# Patient Record
Sex: FEMALE | Race: ASIAN | NOT HISPANIC OR LATINO | Employment: UNEMPLOYED | URBAN - METROPOLITAN AREA
[De-identification: names, ages, dates, MRNs, and addresses within clinical notes are randomized per-mention and may not be internally consistent; named-entity substitution may affect disease eponyms.]

---

## 2017-01-30 ENCOUNTER — AMBULATORY - HEALTHEAST (OUTPATIENT)
Dept: HEALTH INFORMATION MANAGEMENT | Facility: CLINIC | Age: 47
End: 2017-01-30

## 2021-05-28 ENCOUNTER — RECORDS - HEALTHEAST (OUTPATIENT)
Dept: ADMINISTRATIVE | Facility: CLINIC | Age: 51
End: 2021-05-28

## 2021-05-29 ENCOUNTER — RECORDS - HEALTHEAST (OUTPATIENT)
Dept: ADMINISTRATIVE | Facility: CLINIC | Age: 51
End: 2021-05-29

## 2021-05-30 ENCOUNTER — RECORDS - HEALTHEAST (OUTPATIENT)
Dept: ADMINISTRATIVE | Facility: CLINIC | Age: 51
End: 2021-05-30

## 2021-05-31 ENCOUNTER — RECORDS - HEALTHEAST (OUTPATIENT)
Dept: ADMINISTRATIVE | Facility: CLINIC | Age: 51
End: 2021-05-31

## 2023-03-29 ENCOUNTER — TRANSFERRED RECORDS (OUTPATIENT)
Dept: HEALTH INFORMATION MANAGEMENT | Facility: CLINIC | Age: 53
End: 2023-03-29

## 2023-03-30 ENCOUNTER — TELEPHONE (OUTPATIENT)
Dept: ALLERGY | Facility: CLINIC | Age: 53
End: 2023-03-30

## 2023-03-30 NOTE — TELEPHONE ENCOUNTER
The phone number provide voice mail is full, not able to left a message 3/30. Referral from [facility] need schedule for environmental allergy.

## 2023-08-29 ENCOUNTER — OFFICE VISIT (OUTPATIENT)
Dept: ALLERGY | Facility: CLINIC | Age: 53
End: 2023-08-29
Payer: COMMERCIAL

## 2023-08-29 VITALS
HEART RATE: 65 BPM | BODY MASS INDEX: 27.97 KG/M2 | OXYGEN SATURATION: 98 % | RESPIRATION RATE: 18 BRPM | WEIGHT: 152.9 LBS

## 2023-08-29 DIAGNOSIS — T48.5X5A RHINITIS MEDICAMENTOSA: ICD-10-CM

## 2023-08-29 DIAGNOSIS — J31.0 RHINITIS MEDICAMENTOSA: ICD-10-CM

## 2023-08-29 DIAGNOSIS — J31.0 NONALLERGIC RHINITIS: ICD-10-CM

## 2023-08-29 DIAGNOSIS — J30.1 SEASONAL ALLERGIC RHINITIS DUE TO POLLEN: Primary | ICD-10-CM

## 2023-08-29 PROCEDURE — 99204 OFFICE O/P NEW MOD 45 MIN: CPT | Mod: 25 | Performed by: ALLERGY & IMMUNOLOGY

## 2023-08-29 PROCEDURE — 95004 PERQ TESTS W/ALRGNC XTRCS: CPT | Performed by: ALLERGY & IMMUNOLOGY

## 2023-08-29 RX ORDER — FLUTICASONE PROPIONATE 50 MCG
2 SPRAY, SUSPENSION (ML) NASAL DAILY
Qty: 16 G | Refills: 11 | Status: SHIPPED | OUTPATIENT
Start: 2023-08-29

## 2023-08-29 RX ORDER — CETIRIZINE HYDROCHLORIDE 10 MG/1
10 TABLET ORAL DAILY
Qty: 90 TABLET | Refills: 3 | Status: SHIPPED | OUTPATIENT
Start: 2023-08-29

## 2023-08-29 NOTE — PATIENT INSTRUCTIONS
Spring, fall allergies    Also, non-allergic rhinitis    Cetirizine 10 mg daily     Flonase 2 sprays each nostril daily    Stop Decongestant--

## 2023-08-29 NOTE — LETTER
8/29/2023         RE: Arleth Gupta  [address]  [place] [address]        Dear Colleague,    Thank you for referring your patient, Arleth Gupta, to the [facility]. Please see a copy of my visit note below.          Subjective  Arlteh is a 52 year old, presenting for the following health issues:  Allergy Consult (Nasal congestion, itchy nose, sneezing. Worse in the morning. )    HPI     Chief complaint:  Allergies    History of Present Illness: This is a pleasant 52-year-old woman I was asked to see for patient by Solange Gupta in regards to allergies.  Patient has had allergy symptoms for many years.  Symptoms occur throughout the year.  She states she has a lot of mucus in her nose and she has to use Claritin-D at least a few per day to improve symptoms.  She states she does not use any nasal rinses or sprays.  No history of asthma.  No cough, wheeze or shortness of breath.  No previous allergy testing.  No history of sinus infections or pneumonias.  No eczema.  She states that her congestion is worse in the morning and she states that cold air seems to make the congestion worse as well.,  Non-smoker    Past medical history: Otherwise unremarkable, cholecystectomy, appendectomy    Social history: They live in an older home that is well-kept, no mold damage, central air, finished basement, no pets, non-smoker     family history: Sons with allergies              Review of Systems   Constitutional, HEENT, cardiovascular, pulmonary, GI, , musculoskeletal, neuro, skin, endocrine and psych systems are negative, except as otherwise noted.      Objective   Pulse 65   Resp 18   Wt 69.4 kg (152 lb 14.4 oz)   SpO2 98%   BMI 27.97 kg/m    Body mass index is 27.97 kg/m .  Physical Exam     Gen: Pleasant female not in acute distress  HEENT: Eyes no erythema of the bulbar or palpebral conjunctiva, no edema. Ears: TMs well visualized, no effusions. Nose: Thick mucus drainage both nasal  passages, pale mucosa, congestion mouth: Throat drainage no lip or tongue edema.   Cardiac: Regular rate and rhythm, no murmurs, rubs or gallops  Respiratory: Clear to auscultation bilaterally, no adventitious breath sounds  Lymph: No visible supraclavicular or cervical lymphadenopathy  Skin: No rashes or lesions    Psych: Alert and oriented times 3      At today s visit the patient/parent and I engaged in an informed consent discussion about allergy testing.  We discussed skin testing, blood testing,  and the alternative of not undergoing any testing. The patient/ parent has a preference for skin testing. We then discussed the risks and benefits of skin testing.  The patient/ parent understands skin testing risks can include, but are not limited to, urticaria, angioedema, shortness of breath, and severe anaphylaxis.  The benefits include, but are not limited, to evaluation for allergens causing symptoms.  After answering the patients/parents questions they have agreed to proceed with skin testing.    30 percutaneous test were placed to the environmental skin test panel.  Positive histamine control with positive test to tree pollen and weed pollen.  Please see scanned photograph.    Impression report and plan:    1.  Allergic rhinitis  2.  Nonallergic rhinitis  3.  Rhinitis medicamentosa    She has combination of allergic and nonallergic rhinitis.  Recommended avoiding decongestants as a suspect this is worsening the symptoms.  Recommended cetirizine 10 mg daily during the spring and fall as well as fluticasone nasal spray 2 sprays each nostril daily.  She will like to consider allergy shots.  I went over the risks and benefits of allergy shots.  I stated risks include hives, swelling, shortness of breath.  I did state that one in 2.5 million shot administrations can result in death.  I stated they must wait in the office for 30 minutes following the shot and carry an epinephrine device on the day of the shot.  I  stated that shots are effective in about 90% of patients.  I stated that they should check with the insurance company prior to proceeding.  They understand the risks and benefits and agreed to proceed.  Epinephrine device will be prescribed.  They are traveling to [place] and will let us know when they return and we will mix the allergy shots at that time.          Again, thank you for allowing me to participate in the care of your patient.        Sincerely,        Anne CHAVEZ MD

## 2023-08-29 NOTE — PROGRESS NOTES
Subjective   Arleth is a 52 year old, presenting for the following health issues:  Allergy Consult (Nasal congestion, itchy nose, sneezing. Worse in the morning. )    HPI     Chief complaint:  Allergies    History of Present Illness: This is a pleasant 52-year-old woman I was asked to see for patient by Solange Gupta in regards to allergies.  Patient has had allergy symptoms for many years.  Symptoms occur throughout the year.  She states she has a lot of mucus in her nose and she has to use Claritin-D at least a few per day to improve symptoms.  She states she does not use any nasal rinses or sprays.  No history of asthma.  No cough, wheeze or shortness of breath.  No previous allergy testing.  No history of sinus infections or pneumonias.  No eczema.  She states that her congestion is worse in the morning and she states that cold air seems to make the congestion worse as well.,  Non-smoker    Past medical history: Otherwise unremarkable, cholecystectomy, appendectomy    Social history: They live in an older home that is well-kept, no mold damage, central air, finished basement, no pets, non-smoker     family history: Sons with allergies              Review of Systems   Constitutional, HEENT, cardiovascular, pulmonary, GI, , musculoskeletal, neuro, skin, endocrine and psych systems are negative, except as otherwise noted.      Objective    Pulse 65   Resp 18   Wt 69.4 kg (152 lb 14.4 oz)   SpO2 98%   BMI 27.97 kg/m    Body mass index is 27.97 kg/m .  Physical Exam     Gen: Pleasant female not in acute distress  HEENT: Eyes no erythema of the bulbar or palpebral conjunctiva, no edema. Ears: TMs well visualized, no effusions. Nose: Thick mucus drainage both nasal passages, pale mucosa, congestion mouth: Throat drainage no lip or tongue edema.   Cardiac: Regular rate and rhythm, no murmurs, rubs or gallops  Respiratory: Clear to auscultation bilaterally, no adventitious breath sounds  Lymph: No visible  supraclavicular or cervical lymphadenopathy  Skin: No rashes or lesions    Psych: Alert and oriented times 3      At today s visit the patient/parent and I engaged in an informed consent discussion about allergy testing.  We discussed skin testing, blood testing,  and the alternative of not undergoing any testing. The patient/ parent has a preference for skin testing. We then discussed the risks and benefits of skin testing.  The patient/ parent understands skin testing risks can include, but are not limited to, urticaria, angioedema, shortness of breath, and severe anaphylaxis.  The benefits include, but are not limited, to evaluation for allergens causing symptoms.  After answering the patients/parents questions they have agreed to proceed with skin testing.    30 percutaneous test were placed to the environmental skin test panel.  Positive histamine control with positive test to tree pollen and weed pollen.  Please see scanned photograph.    Impression report and plan:    1.  Allergic rhinitis  2.  Nonallergic rhinitis  3.  Rhinitis medicamentosa    She has combination of allergic and nonallergic rhinitis.  Recommended avoiding decongestants as a suspect this is worsening the symptoms.  Recommended cetirizine 10 mg daily during the spring and fall as well as fluticasone nasal spray 2 sprays each nostril daily.  She will like to consider allergy shots.  I went over the risks and benefits of allergy shots.  I stated risks include hives, swelling, shortness of breath.  I did state that one in 2.5 million shot administrations can result in death.  I stated they must wait in the office for 30 minutes following the shot and carry an epinephrine device on the day of the shot.  I stated that shots are effective in about 90% of patients.  I stated that they should check with the insurance company prior to proceeding.  They understand the risks and benefits and agreed to proceed.  Epinephrine device will be prescribed.   They are traveling to [place] and will let us know when they return and we will mix the allergy shots at that time.

## 2024-04-28 ENCOUNTER — APPOINTMENT (OUTPATIENT)
Dept: RADIOLOGY | Facility: HOSPITAL | Age: 54
End: 2024-04-28
Attending: EMERGENCY MEDICINE

## 2024-04-28 ENCOUNTER — HOSPITAL ENCOUNTER (EMERGENCY)
Facility: HOSPITAL | Age: 54
Discharge: HOME | End: 2024-04-28
Attending: EMERGENCY MEDICINE | Admitting: EMERGENCY MEDICINE

## 2024-04-28 ENCOUNTER — APPOINTMENT (OUTPATIENT)
Dept: CT IMAGING | Facility: HOSPITAL | Age: 54
End: 2024-04-28
Attending: EMERGENCY MEDICINE

## 2024-04-28 VITALS
TEMPERATURE: 98 F | RESPIRATION RATE: 18 BRPM | DIASTOLIC BLOOD PRESSURE: 86 MMHG | SYSTOLIC BLOOD PRESSURE: 154 MMHG | OXYGEN SATURATION: 95 % | HEART RATE: 76 BPM

## 2024-04-28 DIAGNOSIS — S09.90XA CLOSED HEAD INJURY, INITIAL ENCOUNTER: ICD-10-CM

## 2024-04-28 DIAGNOSIS — S80.12XA TRAUMATIC ECCHYMOSIS OF LOWER LEG, LEFT, INITIAL ENCOUNTER: ICD-10-CM

## 2024-04-28 DIAGNOSIS — S80.11XA TRAUMATIC ECCHYMOSIS OF LOWER LEG, RIGHT, INITIAL ENCOUNTER: ICD-10-CM
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
## 2024-04-28 PROCEDURE — 96374 THER/PROPH/DIAG INJ IV PUSH: CPT

## 2024-04-28 PROCEDURE — 250N000013 HC RX MED GY IP 250 OP 250 PS 637: Performed by: EMERGENCY MEDICINE

## 2024-04-28 PROCEDURE — 72125 CT NECK SPINE W/O DYE: CPT

## 2024-04-28 PROCEDURE — 70450 CT HEAD/BRAIN W/O DYE: CPT

## 2024-04-28 PROCEDURE — 96375 TX/PRO/DX INJ NEW DRUG ADDON: CPT

## 2024-04-28 PROCEDURE — 73590 X-RAY EXAM OF LOWER LEG: CPT | Mod: RT

## 2024-04-28 PROCEDURE — 99285 EMERGENCY DEPT VISIT HI MDM: CPT | Mod: 25

## 2024-04-28 PROCEDURE — 250N000011 HC RX IP 250 OP 636: Performed by: EMERGENCY MEDICINE

## 2024-04-28 RX ORDER — ONDANSETRON 2 MG/ML
8 INJECTION INTRAMUSCULAR; INTRAVENOUS ONCE
Status: COMPLETED | OUTPATIENT
Start: 2024-04-28 | End: 2024-04-28

## 2024-04-28 RX ORDER — ACETAMINOPHEN 325 MG/1
975 TABLET ORAL ONCE
Status: COMPLETED | OUTPATIENT
Start: 2024-04-28 | End: 2024-04-28

## 2024-04-28 RX ORDER — KETOROLAC TROMETHAMINE 30 MG/ML
30 INJECTION, SOLUTION INTRAMUSCULAR; INTRAVENOUS ONCE
Status: COMPLETED | OUTPATIENT
Start: 2024-04-28 | End: 2024-04-28

## 2024-04-28 RX ADMIN — ACETAMINOPHEN 975 MG: 325 TABLET ORAL at 01:20

## 2024-04-28 RX ADMIN — ONDANSETRON 8 MG: 2 INJECTION INTRAMUSCULAR; INTRAVENOUS at 00:35

## 2024-04-28 RX ADMIN — KETOROLAC TROMETHAMINE 30 MG: 30 INJECTION, SOLUTION INTRAMUSCULAR at 01:20

## 2024-04-28 ASSESSMENT — COLUMBIA-SUICIDE SEVERITY RATING SCALE - C-SSRS
2. HAVE YOU ACTUALLY HAD ANY THOUGHTS OF KILLING YOURSELF IN THE PAST MONTH?: NO
6. HAVE YOU EVER DONE ANYTHING, STARTED TO DO ANYTHING, OR PREPARED TO DO ANYTHING TO END YOUR LIFE?: NO
1. IN THE PAST MONTH, HAVE YOU WISHED YOU WERE DEAD OR WISHED YOU COULD GO TO SLEEP AND NOT WAKE UP?: NO

## 2024-04-28 ASSESSMENT — ACTIVITIES OF DAILY LIVING (ADL)
ADLS_ACUITY_SCORE: 35
ADLS_ACUITY_SCORE: 35

## 2024-04-28 NOTE — ED NOTES
Pt got up and ambulated in the hallway with one assist. Pt stated that right leg has more pain and can not put weight on. Pt stated will need a water to go home with. Notified current MD.
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
## 2024-04-28 NOTE — DISCHARGE INSTRUCTIONS
CT of the head and cervical spine today were unremarkable.  X-rays of both legs were unremarkable.  Use Tylenol 1000 mg 4 times daily as needed for pain.  Ibuprofen 800 mg 3 times daily as needed for pain.  Ice the affected area 20 minutes at a time 3-4 times daily.
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
